# Patient Record
(demographics unavailable — no encounter records)

---

## 2025-03-26 NOTE — DATA REVIEWED
[de-identified] : CT from 2/2025.  Unchanged position of the right frontal approach ventriculostomy catheter with the tip terminating near the foramen of Flowers. Ventricles/extra-axial spaces: No acute hydrocephalus. No new extra-axial fluid collection. Unchanged size and configuration of the ventricular system including the asymmetric prominence of the anterior horn of the left lateral ventricle measuring up to 1 cm. Cranium/scalp: Status post left frontal temporal craniectomy.Interval near resolution of the collection along the craniectomy site with residual soft tissue thickening along the dura plasty.

## 2025-03-26 NOTE — HISTORY OF PRESENT ILLNESS
[de-identified] : Ms. Lynn presents today for initial evaluation.  Medical hx: ruptured AVM, CVA, aneurysm of ICA, chronic anemia, hx of DVT, seizures  She has a history of ruptured AVM s/p L craniectomy and bone flap in 2007, was treated at [facility] in [place]. C/b pneumocephalus s/p sinus repair and hydrocephalus s/p  shunt, further c/b sunken flap and bone resorption, s/p bone flap removal and frontal sinus cranialization/obliteration on 3/26/24 with Dr. Caprice Lopez and Dr. Wesley Ford at [facility], OR culture with C acnes and CoNS, treated with 6 weeks of IV abx (ceftriaxone and vancomycin). 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   summary: 2007- L craniectomy c/b pneumocephalus s/p sinus repair and hydrocephalus s/p  shunt 3/26/24 bone flap removal and frontal sinus cranialization/obliteration 7/2024 titanium mesh cranioplasty 10/22/24 exposed hardware s/p replacement of titanium mesh with free flap 10/30/24 second free flap 11/2024 shunt infection (shunt replaced to R side) 1/7/25 titanium mesh removal d/t hardware exposure  Presents today for second opinion.  Has daily diffuse headaches. Pain is better since 1/7/25 surgery. Takes tylenol which provides relief. No images to review at this visit.  Scalp: Head shape appears asymmetrical. R skin graft well integrated. R VPS pumping and refilling briskly. Scalp incision sites are healed with scabbing along incision. No areas of wound dehiscence. No fluctuance or palpable fluid collections. L flap sunken.  A&Ox3

## 2025-03-26 NOTE — ASSESSMENT
[FreeTextEntry1] : Ms. Lynn is a 54 year old female with complicated neurosurgical hx including ruptured AVM s/p L craniectomy in 2007, osteomyeltis and frontal sinus invasion, multiple procedures including scalp flaps and cranioplasty, then requiring multiple free flaps, shunt infection with removal and reimplantation, with latest surgery on 1/7/25 of washout and hardware explantation. Presents today for second opinion regarding cranioplasty.   Discussed since patient is on a prolonged course of fluconazole cranioplasty should not be attempted until infection clears. She should be reevaluated once off of antifungal medication as she is high risk for recurrent infection (at least 6 months from now). Optimize wound healing with hyperbaric oxygen chamber therapy and nutrition before cranioplasty. Recommend omega 3, vitamin B complex, vitamin C, vitamin E, magnesium.  PLAN: -patient to optimize herself with hyperbaric oxygen therapy near home, functional medicine -we will order new MRI without contrast  -return to Dr. Hirsch after MRI via tele to review   Patient verbalized understanding and agreement with treatment plan.  I have spent 60 minutes of time on the encounter which excludes time spent teaching or on separately reportable services during the encounter

## 2025-03-26 NOTE — HISTORY OF PRESENT ILLNESS
[de-identified] : Ms. Lynn presents today for initial evaluation.  Medical hx: ruptured AVM, CVA, aneurysm of ICA, chronic anemia, hx of DVT, seizures  She has a history of ruptured AVM s/p L craniectomy and bone flap in 2007, was treated at [facility] in [place]. C/b pneumocephalus s/p sinus repair and hydrocephalus s/p  shunt, further c/b sunken flap and bone resorption, s/p bone flap removal and frontal sinus cranialization/obliteration on 3/26/24 with Dr. Caprice Lopez and Dr. Wesley Ford at [facility], OR culture with C acnes and CoNS, treated with 6 weeks of IV abx (ceftriaxone and vancomycin). 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   summary: 2007- L craniectomy c/b pneumocephalus s/p sinus repair and hydrocephalus s/p  shunt 3/26/24 bone flap removal and frontal sinus cranialization/obliteration 7/2024 titanium mesh cranioplasty 10/22/24 exposed hardware s/p replacement of titanium mesh with free flap 10/30/24 second free flap 11/2024 shunt infection (shunt replaced to R side) 1/7/25 titanium mesh removal d/t hardware exposure  Presents today for second opinion.  Has daily diffuse headaches. Pain is better since 1/7/25 surgery. Takes tylenol which provides relief. No images to review at this visit.  Scalp: Head shape appears asymmetrical. R skin graft well integrated. R VPS pumping and refilling briskly. Scalp incision sites are healed with scabbing along incision. No areas of wound dehiscence. No fluctuance or palpable fluid collections. L flap sunken.  A&Ox3

## 2025-03-26 NOTE — DATA REVIEWED
[de-identified] : CT from 2/2025.  Unchanged position of the right frontal approach ventriculostomy catheter with the tip terminating near the foramen of Flowers. Ventricles/extra-axial spaces: No acute hydrocephalus. No new extra-axial fluid collection. Unchanged size and configuration of the ventricular system including the asymmetric prominence of the anterior horn of the left lateral ventricle measuring up to 1 cm. Cranium/scalp: Status post left frontal temporal craniectomy.Interval near resolution of the collection along the craniectomy site with residual soft tissue thickening along the dura plasty.

## 2025-03-26 NOTE — HISTORY OF PRESENT ILLNESS
[de-identified] : Ms. Lynn presents today for initial evaluation.  Medical hx: ruptured AVM, CVA, aneurysm of ICA, chronic anemia, hx of DVT, seizures  She has a history of ruptured AVM s/p L craniectomy and bone flap in 2007, was treated at [facility] in [place]. C/b pneumocephalus s/p sinus repair and hydrocephalus s/p  shunt, further c/b sunken flap and bone resorption, s/p bone flap removal and frontal sinus cranialization/obliteration on 3/26/24 with Dr. Caprice Lopez and Dr. Wesley Ford at [facility], OR culture with C acnes and CoNS, treated with 6 weeks of IV abx (ceftriaxone and vancomycin). 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   summary: 2007- L craniectomy c/b pneumocephalus s/p sinus repair and hydrocephalus s/p  shunt 3/26/24 bone flap removal and frontal sinus cranialization/obliteration 7/2024 titanium mesh cranioplasty 10/22/24 exposed hardware s/p replacement of titanium mesh with free flap 10/30/24 second free flap 11/2024 shunt infection (shunt replaced to R side) 1/7/25 titanium mesh removal d/t hardware exposure  Presents today for second opinion.  Has daily diffuse headaches. Pain is better since 1/7/25 surgery. Takes tylenol which provides relief. No images to review at this visit.  Scalp: Head shape appears asymmetrical. R skin graft well integrated. R VPS pumping and refilling briskly. Scalp incision sites are healed with scabbing along incision. No areas of wound dehiscence. No fluctuance or palpable fluid collections. L flap sunken.  A&Ox3

## 2025-03-26 NOTE — DATA REVIEWED
[de-identified] : CT from 2/2025.  Unchanged position of the right frontal approach ventriculostomy catheter with the tip terminating near the foramen of Flowers. Ventricles/extra-axial spaces: No acute hydrocephalus. No new extra-axial fluid collection. Unchanged size and configuration of the ventricular system including the asymmetric prominence of the anterior horn of the left lateral ventricle measuring up to 1 cm. Cranium/scalp: Status post left frontal temporal craniectomy.Interval near resolution of the collection along the craniectomy site with residual soft tissue thickening along the dura plasty.

## 2025-03-26 NOTE — PHYSICAL EXAM
[General Appearance - Alert] : alert [General Appearance - In No Acute Distress] : in no acute distress [Oriented To Time, Place, And Person] : oriented to person, place, and time [Cranial Nerves Accessory (XI - Cranial And Spinal)] : head turning and shoulder shrug symmetric [Cranial Nerves Hypoglossal (XII)] : there was no tongue deviation with protrusion [Hemiparesis Of Right Side] : hemiparesis is present on the right [Sclera] : the sclera and conjunctiva were normal [Outer Ear] : the ears and nose were normal in appearance [Neck Appearance] : the appearance of the neck was normal [] : no respiratory distress [Skin Color & Pigmentation] : normal skin color and pigmentation [Cranial Nerves Oculomotor (III)] : extraocular motion intact

## 2025-03-26 NOTE — DATA REVIEWED
[de-identified] : CT from 2/2025.  Unchanged position of the right frontal approach ventriculostomy catheter with the tip terminating near the foramen of Flowers. Ventricles/extra-axial spaces: No acute hydrocephalus. No new extra-axial fluid collection. Unchanged size and configuration of the ventricular system including the asymmetric prominence of the anterior horn of the left lateral ventricle measuring up to 1 cm. Cranium/scalp: Status post left frontal temporal craniectomy.Interval near resolution of the collection along the craniectomy site with residual soft tissue thickening along the dura plasty.

## 2025-03-26 NOTE — HISTORY OF PRESENT ILLNESS
[de-identified] : Ms. Lynn presents today for initial evaluation.  Medical hx: ruptured AVM, CVA, aneurysm of ICA, chronic anemia, hx of DVT, seizures  She has a history of ruptured AVM s/p L craniectomy and bone flap in 2007, was treated at [facility] in [place]. C/b pneumocephalus s/p sinus repair and hydrocephalus s/p  shunt, further c/b sunken flap and bone resorption, s/p bone flap removal and frontal sinus cranialization/obliteration on 3/26/24 with Dr. Caprice Lopez and Dr. Wesley Ford at [facility], OR culture with C acnes and CoNS, treated with 6 weeks of IV abx (ceftriaxone and vancomycin). 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   summary: 2007- L craniectomy c/b pneumocephalus s/p sinus repair and hydrocephalus s/p  shunt 3/26/24 bone flap removal and frontal sinus cranialization/obliteration 7/2024 titanium mesh cranioplasty 10/22/24 exposed hardware s/p replacement of titanium mesh with free flap 10/30/24 second free flap 11/2024 shunt infection (shunt replaced to R side) 1/7/25 titanium mesh removal d/t hardware exposure  Presents today for second opinion.  Has daily diffuse headaches. Pain is better since 1/7/25 surgery. Takes tylenol which provides relief. No images to review at this visit.  Scalp: Head shape appears asymmetrical. R skin graft well integrated. R VPS pumping and refilling briskly. Scalp incision sites are healed with scabbing along incision. No areas of wound dehiscence. No fluctuance or palpable fluid collections. L flap sunken.  A&Ox3